# Patient Record
Sex: MALE | Race: BLACK OR AFRICAN AMERICAN | NOT HISPANIC OR LATINO | Employment: FULL TIME | URBAN - METROPOLITAN AREA
[De-identification: names, ages, dates, MRNs, and addresses within clinical notes are randomized per-mention and may not be internally consistent; named-entity substitution may affect disease eponyms.]

---

## 2018-11-07 ENCOUNTER — OFFICE VISIT (OUTPATIENT)
Dept: SLEEP MEDICINE | Facility: CLINIC | Age: 43
End: 2018-11-07
Payer: COMMERCIAL

## 2018-11-07 VITALS
BODY MASS INDEX: 36.66 KG/M2 | HEART RATE: 66 BPM | OXYGEN SATURATION: 95 % | SYSTOLIC BLOOD PRESSURE: 152 MMHG | WEIGHT: 276.6 LBS | DIASTOLIC BLOOD PRESSURE: 98 MMHG | HEIGHT: 73 IN

## 2018-11-07 DIAGNOSIS — E66.09 CLASS 2 OBESITY DUE TO EXCESS CALORIES WITH BODY MASS INDEX (BMI) OF 36.0 TO 36.9 IN ADULT: Primary | ICD-10-CM

## 2018-11-07 DIAGNOSIS — E66.812 CLASS 2 OBESITY DUE TO EXCESS CALORIES WITHOUT SERIOUS COMORBIDITY WITH BODY MASS INDEX (BMI) OF 36.0 TO 36.9 IN ADULT: ICD-10-CM

## 2018-11-07 DIAGNOSIS — G47.33 OSA (OBSTRUCTIVE SLEEP APNEA): Primary | ICD-10-CM

## 2018-11-07 DIAGNOSIS — E66.812 CLASS 2 OBESITY DUE TO EXCESS CALORIES WITH BODY MASS INDEX (BMI) OF 36.0 TO 36.9 IN ADULT: Primary | ICD-10-CM

## 2018-11-07 DIAGNOSIS — R03.0 ELEVATED BLOOD PRESSURE READING: ICD-10-CM

## 2018-11-07 DIAGNOSIS — E66.09 CLASS 2 OBESITY DUE TO EXCESS CALORIES WITHOUT SERIOUS COMORBIDITY WITH BODY MASS INDEX (BMI) OF 36.0 TO 36.9 IN ADULT: ICD-10-CM

## 2018-11-07 LAB
ANION GAP SERPL CALCULATED.3IONS-SCNC: 4 MMOL/L (ref 3–14)
BUN SERPL-MCNC: 17 MG/DL (ref 7–30)
CALCIUM SERPL-MCNC: 9.6 MG/DL (ref 8.5–10.1)
CHLORIDE SERPL-SCNC: 107 MMOL/L (ref 94–109)
CHOLEST SERPL-MCNC: 164 MG/DL
CO2 SERPL-SCNC: 30 MMOL/L (ref 20–32)
CREAT SERPL-MCNC: 1.08 MG/DL (ref 0.66–1.25)
GFR SERPL CREATININE-BSD FRML MDRD: 75 ML/MIN/1.7M2
GLUCOSE SERPL-MCNC: 110 MG/DL (ref 70–99)
HBA1C MFR BLD: 6.3 % (ref 0–5.6)
HDLC SERPL-MCNC: 31 MG/DL
LDLC SERPL CALC-MCNC: 76 MG/DL
NONHDLC SERPL-MCNC: 133 MG/DL
POTASSIUM SERPL-SCNC: 4.4 MMOL/L (ref 3.4–5.3)
SODIUM SERPL-SCNC: 141 MMOL/L (ref 133–144)
TRIGL SERPL-MCNC: 287 MG/DL
TSH SERPL DL<=0.005 MIU/L-ACNC: 1.1 MU/L (ref 0.4–4)

## 2018-11-07 PROCEDURE — 83036 HEMOGLOBIN GLYCOSYLATED A1C: CPT | Performed by: INTERNAL MEDICINE

## 2018-11-07 PROCEDURE — 36415 COLL VENOUS BLD VENIPUNCTURE: CPT | Performed by: INTERNAL MEDICINE

## 2018-11-07 PROCEDURE — 80048 BASIC METABOLIC PNL TOTAL CA: CPT | Performed by: INTERNAL MEDICINE

## 2018-11-07 PROCEDURE — 80061 LIPID PANEL: CPT | Performed by: INTERNAL MEDICINE

## 2018-11-07 PROCEDURE — 84443 ASSAY THYROID STIM HORMONE: CPT | Performed by: INTERNAL MEDICINE

## 2018-11-07 PROCEDURE — 99204 OFFICE O/P NEW MOD 45 MIN: CPT | Performed by: INTERNAL MEDICINE

## 2018-11-07 NOTE — NURSING NOTE
Chief Complaint   Patient presents with     Sleep Problem     I have been told that I stop breathing in my sleep. Also snores really loud.       Initial There were no vitals taken for this visit. There is no height or weight on file to calculate BMI.    Medication Reconciliation: complete    Neck circumference: 17.5 inches / 44.5 centimeters.

## 2018-11-07 NOTE — MR AVS SNAPSHOT
After Visit Summary   11/7/2018    Lopez Russ    MRN: [id number]           Patient Information     Date Of Birth          1975        Visit Information        Provider Department      11/7/2018 9:00 AM Jadiel Crowder MD [facility]        Today's Diagnoses     KYLAH (obstructive sleep apnea)    -  1    Elevated blood pressure reading        Class 2 obesity due to excess calories without serious comorbidity with body mass index (BMI) of 36.0 to 36.9 in adult          Care Instructions      Your BMI is Body mass index is 36.49 kg/(m^2).  Weight management is a personal decision.  If you are interested in exploring weight loss strategies, the following discussion covers the approaches that may be successful. Body mass index (BMI) is one way to tell whether you are at a healthy weight, overweight, or obese. It measures your weight in relation to your height.  A BMI of 18.5 to 24.9 is in the healthy range. A person with a BMI of 25 to 29.9 is considered overweight, and someone with a BMI of 30 or greater is considered obese. More than two-thirds of American adults are considered overweight or obese.  Being overweight or obese increases the risk for further weight gain. Excess weight may lead to heart disease and diabetes.  Creating and following plans for healthy eating and physical activity may help you improve your health.  Weight control is part of healthy lifestyle and includes exercise, emotional health, and healthy eating habits. Careful eating habits lifelong are the mainstay of weight control. Though there are significant health benefits from weight loss, long-term weight loss with diet alone may be very difficult to achieve- studies show long-term success with dietary management in less than 10% of people. Attaining a healthy weight may be especially difficult to achieve in those with severe obesity. In some cases, medications, devices and surgical management  might be considered.  What can you do?  If you are overweight or obese and are interested in methods for weight loss, you should discuss this with your provider.     Consider reducing daily calorie intake by 500 calories.     Keep a food journal.     Avoiding skipping meals, consider cutting portions instead.    Diet combined with exercise helps maintain muscle while optimizing fat loss. Strength training is particularly important for building and maintaining muscle mass. Exercise helps reduce stress, increase energy, and improves fitness. Increasing exercise without diet control, however, may not burn enough calories to loose weight.       Start walking three days a week 10-20 minutes at a time    Work towards walking thirty minutes five days a week     Eventually, increase the speed of your walking for 1-2 minutes at time    In addition, we recommend that you review healthy lifestyles and methods for weight loss available through the National Institutes of Health patient information sites:  http://win.niddk.nih.gov/publications/index.htm    And look into health and wellness programs that may be available through your health insurance provider, employer, local community center, or wade club.    Weight management plan: Patient was referred to their PCP to discuss a diet and exercise plan.              Follow-ups after your visit        Your next 10 appointments already scheduled     Nov 07, 2018 10:45 AM CST   LAB with BK LAB   [facility] ([facility])    [address] [place] [id number] [phone]           Please do not eat 10-12 hours before your appointment if you are coming in fasting for labs on lipids, cholesterol, or glucose (sugar). This does not apply to pregnant women. Water, hot tea and black coffee (with nothing added) are okay. Do not drink other fluids, diet soda or chew gum.            Nov 19, 2018 10:30 AM CST   HST  with BK BED  5   [facility] ([facility])    [address]  [facility] [address]   [phone]            Nov 20, 2018  8:00 AM CST   HST Drop Off with BK BED 5   [facility] ([facility])    [address]  [facility] [address]   [phone]            Nov 27, 2018  9:30 AM CST   Return Sleep Patient with Jadiel Crowder MD   [facility] ([facility])    [address]  [facility] [address]   [phone]              Future tests that were ordered for you today     Open Future Orders        Priority Expected Expires Ordered    HST-Home Sleep Apnea Test Routine  5/9/2019 11/7/2018    Basic metabolic panel Routine  11/7/2019 11/7/2018    Lipid panel reflex to direct LDL Non-fasting Routine 11/7/2018 11/21/2018 11/7/2018    TSH Routine  11/7/2019 11/7/2018            Who to contact     If you have questions or need follow up information about today's clinic visit or your schedule please contact [facility] directly at [phone].  Normal or non-critical lab and imaging results will be communicated to you by MyChart, letter or phone within 4 business days after the clinic has received the results. If you do not hear from us within 7 days, please contact the clinic through MyChart or phone. If you have a critical or abnormal lab result, we will notify you by phone as soon as possible.  Submit refill requests through Verient or call your pharmacy and they will forward the refill request to us. Please allow 3 business days for your refill to be completed.          Additional Information About Your Visit        Verient Information     Verient lets you send messages to your doctor, view your test results, renew your prescriptions, schedule appointments and more. To sign up, go to [email or web address] .  "Click on \"Log in\" on the left side of the screen, which will take you to the Welcome page. Then click on \"Sign up Now\" on the right side of the page.     You will be asked to enter the access code listed below, as well as some personal information. Please follow the directions to create your username and password.     Your access code is: [email or web address]  Expires: 2019  9:56 AM     Your access code will  in 90 days. If you need help or a new code, please call your [place] clinic or [phone].        Care EveryWhere ID     This is your Care EveryWhere ID. This could be used by other organizations to access your [place] medical records  [id number]        Your Vitals Were     Pulse Height Pulse Oximetry BMI (Body Mass Index)          66 1.854 m (6' 1\") 95% 36.49 kg/m2         Blood Pressure from Last 3 Encounters:   18 (!) 152/98   07/31/15 165/87   13 (!) 146/105    Weight from Last 3 Encounters:   18 125.5 kg (276 lb 9.6 oz)   13 124.7 kg (275 lb)              We Performed the Following     Hemoglobin A1c        Primary Care Provider Fax #    Physician No Ref-Primary [phone]       No address on file        Equal Access to Services     BOSSMAN SWAIN : Hadii cedrick tonyo Soerika, waaxda luqadaha, qaybta kaalmada adedeepak, juana marie . So [facility] [phone].    ATENCIÓN: Si habla español, tiene a chu disposición servicios gratuitos de asistencia lingüística. Llame al [phone].    We comply with applicable federal civil rights laws and [place] laws. We do not discriminate on the basis of race, color, national origin, age, disability, sex, sexual orientation, or gender identity.            Thank you!     Thank you for choosing [facility]  for your care. Our goal is always to provide you with excellent care. Hearing back from our patients is one way we can continue to improve our services. Please take a few minutes to complete " the written survey that you may receive in the mail after your visit with us. Thank you!             Your Updated Medication List - Protect others around you: Learn how to safely use, store and throw away your medicines at www.disposemymeds.org.      Notice  As of 11/7/2018 10:06 AM    You have not been prescribed any medications.

## 2018-11-07 NOTE — PROGRESS NOTES
Sleep Consultation:    Date on this visit: 11/7/2018    Primary Physician: No Ref-Primary, Physician     Lopez Russ is a 43 year old male presenting with complaints of snoring and witnessed apneas.    He has a history of obesity.      Schedule - Works as  for digital [employer].  Works 6 PM - 6 AM.  Typically working 4 - 5 shifts per week (4 one week and 5 the next).  Will work M Tu F Sa Su then W Th the next week, but often picks up another weekday (takes weekends off for watching kids).    If he works will sleep 10 AM - 4 PM; may have some trouble falling asleep.     If he's not working may sleep early so he can get up for the day, or may wait until night and go to bed in the early evening.    Sleep Disordered Breathing - Snoring is reported as loud and audible throughout the house.  Has had snort arousals and witnessed.   Has nocturia.  Occasional nocturnal GERD.   Upon waking feels groggy.  He denies morning headaches.  Does get morning dry mouth.  Does get morning sinus congestion.   Patient was counseled on the importance of driving while alert, to pull over if drowsy, or nap before getting into the vehicle if sleepy.    Movement/Behaviors - Does have somniloquy.  No somnambulism.  No sleep related eating.  Has had violent dream enactment behavior.   Patient denies typical restless legs syndrome symptoms.     He denies bruxism.     Alcohol use - Rare  Caffeine intake - 3 - 4 cups/day of coffee, 5 sodas/day. Last caffeine intake is usually throughout the day and night.  Tobacco exposure - Quit 4 days ago, was 1/2 ppd.  Illicit substances - MJ every day    Lives alone but has his 2 kids every other weekend.  Has no pets.     Does have family history of snoring in sister, brother, and mother.    Allergies:    No Known Allergies    Medications:    No current outpatient prescriptions on file.       Problem List:  Patient Active Problem List    Diagnosis Date Noted     Elevated blood  "pressure reading 11/07/2018     Priority: Medium        Past Medical/Surgical History:  Past Medical History:   Diagnosis Date     Gastro-oesophageal reflux disease      No past surgical history on file.    Social History:  Social History     Social History     Marital status: Single     Spouse name: N/A     Number of children: N/A     Years of education: N/A     Occupational History     Not on file.     Social History Main Topics     Smoking status: Former Smoker     Packs/day: 0.50     Quit date: 11/3/2018     Smokeless tobacco: Never Used     Alcohol use Yes      Comment: \"weekends\"     Drug use: Yes     Special: Marijuana     Sexual activity: Not on file     Other Topics Concern     Not on file     Social History Narrative       Family History:  No family history on file.    Review of Systems:  A complete review of systems reviewed by me is negative with the exeption of what has been mentioned in the history of present illness.  Increased thirst or urination.    Physical Examination:  Vitals: BP (!) 152/98  Pulse 66  Ht 1.854 m (6' 1\")  Wt 125.5 kg (276 lb 9.6 oz)  SpO2 95%  BMI 36.49 kg/m2  BMI= Body mass index is 36.49 kg/(m^2).    Neck Cir (cm): 45 cm    Davenport Total Score 11/7/2018   Total score - Davenport 13     REECE Total Score: 24 (11/07/18 0900)    General appearance: No apparent distress, well groomed.    HEENT:   Head: Normocephalic, atraumatic.  Eyes: PERRL  Nose: Nares patent.  No exudate.  No septal deviation noted.  Mouth: Teeth: Good   Tongue: Scalloped  Oropharynx:  Mallampati Classification: IV    Tonsils: Not visible    Uvula: Not visible    Neck: Supple without bruit.     Neck Cir (cm): 45 cm (11/7/2018  9:00 AM)    Cardiac: Regular rate and rhythm.  No murmurs.  Radial pulses are strong and symmetric.  Pulmonary: Symmetric air movement, lungs clear to auscultation bilaterally.  Musculoskeletal: No edema noted.  No clubbing or cyanosis.  Skin: Warm, dry, intact.  Neurologic: Alert and " oriented to person, place and time   Gait is normal.  Psychiatric: Affect pleasant.  Mood good.    Impression/Plan:  1. KYLAH (obstructive sleep apnea)  I have a high suspicion for KYLAH based on presence of snoring, snort arousals, witnessed apneas, enlarged neck girth >= 43 cm for male, and obesity with excessive daytime sleepiness. We discussed pathophysiology of obstructive sleep apnea, testing with overnight polysomnography, and treatment modalities (CPAP only discussed at this visit). We discussed consequences of untreated KYLAH. Patient is interested in treatment and willing to undergo overnight sleep testing.    Home sleep testing is appropriate for this patient  Study has been ordered today.    - HST-Home Sleep Apnea Test; Future    2. Elevated blood pressure reading  Patient was counseled on the effects of untreated/sub-optimally treated hypertension.    Patient to follow up with Primary Care provider regarding elevated blood pressure.   Encouraged to make PCP appt today with new provider.    3. Class 2 obesity due to excess calories without serious comorbidity with body mass index (BMI) of 36.0 to 36.9 in adult  We discussed the link between obesity, sleep apnea, and health outcomes.  Patient was encouraged to decrease caloric intake and increase activity levels to try to move towards a normal weight.  He was encouraged to discuss further strategies with his primary care provider.   Will screen for hypothyroidism, DM2 (given prior borderline elevated BG and increased thirst/urination), kidney disease and hyperlipidemia as part of metabolic syndrome.  - Hemoglobin A1c  - Basic metabolic panel; Future  - Lipid panel reflex to direct LDL Non-fasting; Future  - TSH; Future    Literature provided regarding sleep apnea.      He will follow up with me in approximately two weeks after his sleep study has been competed to review the results and discuss plan of care.       Sleep Study reviewed.  Obstructive sleep apnea  reviewed.  Complications of untreated sleep apnea were reviewed.    Jadiel Crowder MD, Sleep Physician  Nov 7, 2018

## 2018-11-07 NOTE — PATIENT INSTRUCTIONS

## 2018-11-20 ENCOUNTER — OFFICE VISIT (OUTPATIENT)
Dept: SLEEP MEDICINE | Facility: CLINIC | Age: 43
End: 2018-11-20
Payer: COMMERCIAL

## 2018-11-20 DIAGNOSIS — G47.33 OSA (OBSTRUCTIVE SLEEP APNEA): Primary | ICD-10-CM

## 2018-11-20 NOTE — MR AVS SNAPSHOT
After Visit Summary   11/20/2018    Lopez Russ    MRN: [id number]           Patient Information     Date Of Birth          1975        Visit Information        Provider Department      11/20/2018 11:45 AM BK BED 5 [facility]        Today's Diagnoses     KYLAH (obstructive sleep apnea)    -  1       Follow-ups after your visit        Your next 10 appointments already scheduled     Nov 23, 2018  7:00 AM CST   [facility]ios [facility]    [address] [place] [id number] [phone]           Bring a current list of meds and any records pertaining to this visit. For Physicals, please bring immunization records and any forms needing to be filled out. Please arrive 10 minutes early to complete paperwork.            Nov 27, 2018  9:30 AM CST   Return Sleep Patient with Jadiel Crowder MD   [facility] ([facility])    [address] [place] [id number] [phone]              Who to contact     If you have questions or need follow up information about today's clinic visit or your schedule please contact [facility] directly at [phone].  Normal or non-critical lab and imaging results will be communicated to you by Zindigohart, letter or phone within 4 business days after the clinic has received the results. If you do not hear from us within 7 days, please contact the clinic through Zindigohart or phone. If you have a critical or abnormal lab result, we will notify you by phone as soon as possible.  Submit refill requests through [email or web address] or call your pharmacy and they will forward the refill request to us. Please allow 3 business days for your refill to be completed.          Additional Information About Your Visit        myfab5 Information     [email or web address] lets you send messages to your doctor, view  "your test results, renew your prescriptions, schedule appointments and more. To sign up, go to [email or web address] . Click on \"Log in\" on the left side of the screen, which will take you to the Welcome page. Then click on \"Sign up Now\" on the right side of the page.     You will be asked to enter the access code listed below, as well as some personal information. Please follow the directions to create your username and password.     Your access code is: [email or web address]  Expires: 2019  9:56 AM     Your access code will  in 90 days. If you need help or a new code, please call your [place] clinic or [phone].        Care EveryWhere ID     This is your Care EveryWhere ID. This could be used by other organizations to access your [place] medical records  [id number]         Blood Pressure from Last 3 Encounters:   18 (!) 152/98   07/31/15 165/87   13 (!) 146/105    Weight from Last 3 Encounters:   18 125.5 kg (276 lb 9.6 oz)   13 124.7 kg (275 lb)              Today, you had the following     No orders found for display       Primary Care Provider Fax #    Physician No Ref-Primary [phone]       No address on file        Equal Access to Services     BOSSMAN SWAIN : Hadii cedrick tonyo Soerika, waaxda luqadaha, qaybta kaalmada adeegyacj, juana marie . So [facility] [phone].    ATENCIÓN: Si habla español, tiene a chu disposición servicios gratuitos de asistencia lingüística. Llame al [phone].    We comply with applicable federal civil rights laws and [place] laws. We do not discriminate on the basis of race, color, national origin, age, disability, sex, sexual orientation, or gender identity.            Thank you!     Thank you for choosing [facility]  for your care. Our goal is always to provide you with excellent care. Hearing back from our patients is one way we can continue to improve our services. Please take a few minutes " to complete the written survey that you may receive in the mail after your visit with us. Thank you!             Your Updated Medication List - Protect others around you: Learn how to safely use, store and throw away your medicines at www.disposemymeds.org.      Notice  As of 11/20/2018 11:58 AM    You have not been prescribed any medications.

## 2018-12-05 ENCOUNTER — OFFICE VISIT (OUTPATIENT)
Dept: SLEEP MEDICINE | Facility: CLINIC | Age: 43
End: 2018-12-05
Payer: COMMERCIAL

## 2018-12-05 DIAGNOSIS — G47.33 OSA (OBSTRUCTIVE SLEEP APNEA): ICD-10-CM

## 2018-12-05 PROCEDURE — G0399 HOME SLEEP TEST/TYPE 3 PORTA: HCPCS | Performed by: INTERNAL MEDICINE

## 2018-12-05 NOTE — PROGRESS NOTES
Pt is completing a home sleep test. Pt was instructed on how to put on the Noxturnal T3 device and associated equipment before going to bed and given the opportunity to practice putting it on before leaving the sleep center. Pt was reminded to bring the home sleep test kit back to the center tomorrow, at agreed upon time for download and reporting.     Suzan Madsen MA on 12/5/2018 at 9:52 AM

## 2018-12-05 NOTE — MR AVS SNAPSHOT
"              After Visit Summary   12/5/2018    Lopez Russ    MRN: [id number]           Patient Information     Date Of Birth          1975        Visit Information        Provider Department      12/5/2018 8:30 AM BK BED 5 [facility]        Today's Diagnoses     KYLAH (obstructive sleep apnea)           Follow-ups after your visit        Your next 10 appointments already scheduled     Dec 06, 2018 11:30 AM CST   HST Drop Off with BK BED 5   [facility] ([facility])    [address] [facility] [facility] [address]  [facility] [id number] [phone]            Dec 19, 2018  1:00 PM CST   Return Sleep Patient with Jadiel Crowder MD   [facility] ([facility])    [address] [place] [address] [facility] [address]  [facility] [id number] [phone]              Who to contact     If you have questions or need follow up information about today's clinic visit or your schedule please contact [facility] directly at [phone].  Normal or non-critical lab and imaging results will be communicated to you by Cinetraffichart, letter or phone within 4 business days after the clinic has received the results. If you do not hear from us within 7 days, please contact the clinic through Think Good Thoughtst or phone. If you have a critical or abnormal lab result, we will notify you by phone as soon as possible.  Submit refill requests through [employer] or call your pharmacy and they will forward the refill request to us. Please allow 3 business days for your refill to be completed.          Additional Information About Your Visit        Cinetraffichart Information     [employer] lets you send messages to your doctor, view your test results, renew your prescriptions, schedule appointments and more. To sign up, go to [email or web address] . Click on \"Log in\" on the left side of the screen, which will take you to the Welcome page. Then click " "on \"Sign up Now\" on the right side of the page.     You will be asked to enter the access code listed below, as well as some personal information. Please follow the directions to create your username and password.     Your access code is: [email or web address]  Expires: 2019  9:56 AM     Your access code will  in 90 days. If you need help or a new code, please call your [place] clinic or [phone].        Care EveryWhere ID     This is your Care EveryWhere ID. This could be used by other organizations to access your [place] medical records  [id number]         Blood Pressure from Last 3 Encounters:   18 (!) 152/98   07/31/15 165/87   13 (!) 146/105    Weight from Last 3 Encounters:   18 125.5 kg (276 lb 9.6 oz)   13 124.7 kg (275 lb)              We Performed the Following     HST-Home Sleep Apnea Test        Primary Care Provider Fax #    Physician No Ref-Primary [phone]       No address on file        Equal Access to Services     [facility] : Hadii cedrick tonyo Soerika, waaxda luqadaha, qaybta kaalmacj [place], juana marie . So [facility] [phone].    ATENCIÓN: Si habla español, tiene a chu disposición servicios gratuitos de asistencia lingüística. Jag al [phone].    We comply with applicable federal civil rights laws and [place] laws. We do not discriminate on the basis of race, color, national origin, age, disability, sex, sexual orientation, or gender identity.            Thank you!     Thank you for choosing [facility]  for your care. Our goal is always to provide you with excellent care. Hearing back from our patients is one way we can continue to improve our services. Please take a few minutes to complete the written survey that you may receive in the mail after your visit with us. Thank you!             Your Updated Medication List - Protect others around you: Learn how to safely use, store and throw away your " medicines at www.disposemymeds.org.      Notice  As of 12/5/2018  9:54 AM    You have not been prescribed any medications.

## 2018-12-06 ENCOUNTER — APPOINTMENT (OUTPATIENT)
Dept: SLEEP MEDICINE | Facility: CLINIC | Age: 43
End: 2018-12-06
Payer: COMMERCIAL

## 2018-12-06 NOTE — PROGRESS NOTES
Pt returned HST device. It was downloaded and forwarded data to the clinical specialist for scoring.    Suzan Madsen MA on 12/6/2018 at 10:57 AM

## 2018-12-11 NOTE — PROGRESS NOTES
Response from Belgica Hernandez :  Jag Singh,    He is OK to complete an inlab study  Thank you,  Winnie    From: Suzan Madsen   Sent: Tuesday, December 11, 2018 12:55 PM  To: Winnie Hernandez  Subject: PHI    Jag Zhou-  Dr. Crowder was wondering if we could get this pt approved for an in-lab psg as he has failed 2 hst s?    Lopez Russ  Male, 43 yrs, 1975  MRN:   [id number]    Suzan Madsen MA on 12/11/2018 at 4:13 PM

## 2019-01-07 ENCOUNTER — THERAPY VISIT (OUTPATIENT)
Dept: SLEEP MEDICINE | Facility: CLINIC | Age: 44
End: 2019-01-07
Payer: COMMERCIAL

## 2019-01-07 DIAGNOSIS — G47.33 OSA (OBSTRUCTIVE SLEEP APNEA): ICD-10-CM

## 2019-01-07 PROCEDURE — 95811 POLYSOM 6/>YRS CPAP 4/> PARM: CPT | Performed by: INTERNAL MEDICINE

## 2019-01-08 PROBLEM — G47.33 OSA (OBSTRUCTIVE SLEEP APNEA): Status: ACTIVE | Noted: 2019-01-08

## 2019-01-08 NOTE — PATIENT INSTRUCTIONS
Completed a split night PSG per provider order.    Preliminary AHI 80.  A final therapeutic PAP pressure was not achieved.    Supine REM was not seen on therapeutic pressure.    Patient reports feeling refreshed in AM.

## 2019-01-08 NOTE — PROCEDURES
" SLEEP STUDY INTERPRETATION  SPLIT NIGHT STUDY      Patient: MATT KERNS  YOB: 1975  Study Date: 1/7/2019  MRN: [id number]  Referring Provider: none  Ordering Provider: Jadiel Crowder MD    Indications for Polysomnography: The patient is a 43 y old Male who is 6' 1\" and weighs 276.0 lbs. His BMI is 36.6, Finley sleepiness scale 13.0 and neck circumference is 45.0 cm. Relevant medical history includes obesity. A diagnostic polysomnogram was performed to evaluate for KYLAH. After 138.5 minutes of sleep time the patient exhibited sufficient respiratory events qualifying him for a CPAP trial which was then initiated.    Polysomnogram Data: A full night polysomnogram recorded the standard physiologic parameters including EEG, EOG, EMG, ECG, nasal and oral airflow. Respiratory parameters of chest and abdominal movements were recorded with respiratory inductance plethysmography. Oxygen saturation was recorded by pulse oximetry.  Hypopnea scoring rule used: 1B 4%    Diagnostic PSG  Sleep Architecture: Normal sleep latency without sleep aid, increased arousal index, and decreased sleep efficiency.  The total recording time of the polysomnogram was 174.0 minutes. The total sleep time was 138.5 minutes. Sleep latency was normal at 5.5 minutes without the use of a sleep aid. REM latency was 99.0 minutes. Arousal index was increased at 90.5 arousals per hour. Sleep efficiency was decreased at 79.6%. Wake after sleep onset was 11.5 minutes. The patient spent 62.1% of total sleep time in Stage N1, 31.0% in Stage N2, 0.0% in Stage N3, and 6.9% in REM. Time in REM supine was 9.5 minutes.    Respiration: Very severe obstructive sleep apnea with associated hypoxia.    Events ? The polysomnogram revealed a presence of 53 obstructive, - central, and - mixed apneas resulting in an apnea index of 23.0 events per hour. There were 132 obstructive hypopneas and - central hypopneas resulting in an obstructive hypopnea index " of 57.2 and central hypopnea index of - events per hour. The combined apnea/hypopnea index was 80.1 events per hour (central apnea/hypopnea index was - events per hour).  The REM AHI was 56.8 events per hour. The supine AHI was 88.1 events per hour. The RERA index was 5.2 events per hour. The RDI was 85.3 events per hour.    Snoring - was reported as moderate to extremely loud.    Respiratory rate and pattern - was normal.    Sustained Sleep Associated Hypoventilation - Transcutaneous carbon dioxide monitoring was not used; however significant hypoventilation was not suggested by oximetry.    Sleep Associated Hypoxemia - (Greater than 5 minutes O2 sat at or below 88%) was present. Baseline oxygen saturation was 85.7%. Lowest oxygen saturation was 36.8%. Time spent less than or equal to 88% was 78.5 minutes. Time spent less than or equal to 89% was 84.6 minutes.     Treatment PSG  Sleep Architecture: On PAP, increased arousal index and decreased sleep efficiency.  At 01:00:42 AM the patient was placed on PAP treatment and was titrated at pressures ranging from CPAP 5 cmH2O up to BiLevel 20/15/0 cmH2O. The total recording time of the treatment portion of the study was 300.8 minutes. The total sleep time was 246.5 minutes. During the treatment portion of the study the sleep latency was 5.0 minutes. REM latency was 3.5 minutes. Arousal index was increased at 23.1 arousals per hour. Sleep efficiency was decreased at 82.0%. Wake after sleep onset was 48.5 minutes. The patient spent 15.6% of total sleep time in Stage N1, 15.4% in Stage N2, 13.2% in Stage N3, and 55.8% in REM. Time in REM supine was - minutes.     Respiration: Adequate titration.  Completely untreated while supine.  Was unable to tolerate nasal mask with CPAP 5 cmH2O and used FFM.     The final pressure was BiLevel 20/15/0 cmH2O with an AHI of - events per hour. Time in REM supine on final pressure was - minutes.     This titration was  considered:  - Adequate (residual AHI with 75% decrease or above constraints without REM?supine sleep at final pressure).    Movement Activity: Few PLMs without evidence of clinical significance.    Periodic Limb Movements  o During the diagnostic portion of the study, there were - PLMs recorded.  o During the treatment portion of the study, there were 4 PLMs recorded. The PLM index was 1.0 movements per hour. The PLM Arousal Index was 0.5 per hour.    REM EMG Activity - Excessive muscle activity was not present.    Nocturnal Behavior - Abnormal sleep related behaviors were not noted.    Bruxism - None apparent.    Cardiac Summary: Frequent PACs.  Heart rate and rhythm stabilized on PAP therapy.  During the diagnostic portion of the study, the average pulse rate was 62.5 bpm. The minimum pulse rate was 25.0 bpm while the maximum pulse rate was 126.2 bpm.    During the treatment portion of the study, the average pulse rate was 62.3 bpm. The minimum pulse rate was 41.8 bpm while the maximum pulse rate was 99.4 bpm.     Assessment:     Normal sleep latency without sleep aid, increased arousal index, and decreased sleep efficiency.    On PAP, increased arousal index and decreased sleep efficiency.    Very severe obstructive sleep apnea with associated hypoxia.    Adequate Bilevel titration.  CPAP was ineffective.     Completely untreated while supine.      Was unable to tolerate nasal mask with CPAP 5 cmH2O and used FFM.     Few PLMs without evidence of clinical significance.    Frequent PACs.  Heart rate and rhythm stabilized on PAP therapy.    Recommendations:    Recommend treatment of KYLAH with Auto?titrating Bilevel PAP therapy with a range of 14 - 25 cmH2O with PS of 5 cmH2O. Recommend clinical follow up with sleep management team, including review of compliance measures.    If devices are not acceptable or effective, patient may benefit from evaluation of possible surgical options for the treatment of obstructive  sleep apnea and/or socially disruptive snoring. If he is interested, would recommend referral to specialized ENT?Sleep provider.    Advice regarding the risks of drowsy driving.    Suggest optimizing sleep schedule and avoiding sleep deprivation.    Weight management (if BMI > 30).    Pharmacologic therapy should be used for management of restless legs syndrome only if present and clinically indicated and not based on the presence of periodic limb movements alone.    Diagnostic Codes:   Obstructive Sleep Apnea G47.33  Sleep Hypoxemia/Hypoventilation G47.36    _____________________________________   Electronically Signed By: Jadiel Crowder MD 1/8/2019

## 2019-01-08 NOTE — PROGRESS NOTES
Severe Obstructive Sleep Apnea but only adequate titration with FFM.  Called patient to review results per urgent protocol.  CPAP ineffective.  Needs Bilevel and likely auto.   Didn't mind treatment.  Order placed through [facility].  Jadiel Crowder MD, Sleep Physician  4:13 PM, 1/8/2019

## 2019-01-09 ENCOUNTER — TELEPHONE (OUTPATIENT)
Dept: SLEEP MEDICINE | Facility: CLINIC | Age: 44
End: 2019-01-09

## 2019-01-09 DIAGNOSIS — G47.33 OSA (OBSTRUCTIVE SLEEP APNEA): ICD-10-CM

## 2019-01-09 LAB — SLPCOMP: NORMAL

## 2019-01-09 NOTE — TELEPHONE ENCOUNTER
Patient called back in to [facility] to schedule pap setup. Patient ok to come into [facility] for STAT setup. Patient unable to come tomorrow. Scheduled for 1 pm on Friday. Patient would like me to email him appointment information to [email or web address]

## 2019-01-11 ENCOUNTER — DOCUMENTATION ONLY (OUTPATIENT)
Dept: SLEEP MEDICINE | Facility: CLINIC | Age: 44
End: 2019-01-11

## 2019-01-11 DIAGNOSIS — G47.33 OSA (OBSTRUCTIVE SLEEP APNEA): ICD-10-CM

## 2019-01-11 NOTE — PROGRESS NOTES
Patient was offered choice of vendor and chose [facility].  Patient Lopez Russ was set up at [place] on January 11, 2019. Patient received a Resmed AirCurve 10 Bilevel. Pressures were set at EPAP 14, IPAP 25, PS 5.   Patient s ramp is 5 cm H2O for 10 min.  Patient received a Resmed Mask name: Airtouch F20  Full Face mask size Medium, heated tubing and heated humidifier.  Patient is enrolled in the STM Program and does not need to meet compliance. Patient has a follow up on 1/29/19 with Dr. Crowder.    Inrgid Nieves

## 2019-01-14 ENCOUNTER — DOCUMENTATION ONLY (OUTPATIENT)
Dept: SLEEP MEDICINE | Facility: CLINIC | Age: 44
End: 2019-01-14
Payer: COMMERCIAL

## 2019-01-14 NOTE — PROGRESS NOTES
3 DAY STM VISIT    Diagnostic AHI: 80.1 PSG    Patient contacted for 3 day STM visit.  Subjective measures:  Patient stated things are going good with is machine. He's thinks he has fixed his mask leak problem. Patient pulls the mask off with out knowing.       Device type: Bilevel  PAP settings from order::  EPAP 14, IPAP 25, PS 5.  Mask type:    Full Face Mask     Device settings from machine      Assessment: Nightly usage, most nights over four hours. Reviewed data with patient and told him it's common to pull your mask off without knowing in the first week or two.   Action plan: Pt to have f/u 14 day STM visit.  Patient has a follow up visit scheduled:   yes within 31-90 days of set up.

## 2019-01-17 ENCOUNTER — TELEPHONE (OUTPATIENT)
Dept: SLEEP MEDICINE | Facility: CLINIC | Age: 44
End: 2019-01-17

## 2019-01-17 DIAGNOSIS — G47.33 OSA (OBSTRUCTIVE SLEEP APNEA): ICD-10-CM

## 2019-01-17 NOTE — TELEPHONE ENCOUNTER
Called patient to follow up with bipap. Patient states things are getting better, he's able to wear his bipap for a longer period of time. Patient states he's feeling more energetic since using bipap. I informed patient that his download looks good so far. Some nights AHI is corrected below 5, and other nights has a residual AHI; highest I've seen so far is 12. I informed patient that's pretty good coming from a baseline AHI of 80.1. Patient was glad to hear that. Instructed patient to call me if he has any questions or concerns, otherwise, I will follow up again soon. Patient agreed to do so and expressed understanding.

## 2019-01-28 ENCOUNTER — DOCUMENTATION ONLY (OUTPATIENT)
Dept: SLEEP MEDICINE | Facility: CLINIC | Age: 44
End: 2019-01-28

## 2019-01-28 DIAGNOSIS — G47.33 OSA (OBSTRUCTIVE SLEEP APNEA): ICD-10-CM

## 2019-01-28 NOTE — PROGRESS NOTES
14  DAY STM VISIT    Diagnostic AHI: 80.1   PSG    Subjective measures:  Pt reporting that his usage is increasing that he is starting to feel  benefit from therapy.  He is not having any issues with pressures .  He is now not feeling any mask leak.      Assessment: Pt not meeting objective benchmarks for compliance, leak and AHI  Patient meeting subjective benchmarks.     Action plan: pt to have 30 day STM visit.      Device type: Bilevel    PAP settings: 25/14 PS 5    Mask type:  Full Face Mask    Objective measures: 14 day rolling measures      Compliance  35 %      Leak  48.77 lpm  last  upload      AHI 6.59   last  upload      Average number of minutes 210      Objective measure goal  Compliance   Goal >70%  Leak   Goal < 24 lpm  AHI  Goal < 5  Usage  Goal >240

## 2019-01-29 ENCOUNTER — OFFICE VISIT (OUTPATIENT)
Dept: SLEEP MEDICINE | Facility: CLINIC | Age: 44
End: 2019-01-29
Payer: COMMERCIAL

## 2019-01-29 ENCOUNTER — TELEPHONE (OUTPATIENT)
Dept: SLEEP MEDICINE | Facility: CLINIC | Age: 44
End: 2019-01-29

## 2019-01-29 VITALS
SYSTOLIC BLOOD PRESSURE: 154 MMHG | HEART RATE: 54 BPM | OXYGEN SATURATION: 95 % | WEIGHT: 284 LBS | BODY MASS INDEX: 36.45 KG/M2 | HEIGHT: 74 IN | DIASTOLIC BLOOD PRESSURE: 95 MMHG

## 2019-01-29 DIAGNOSIS — G47.33 OSA (OBSTRUCTIVE SLEEP APNEA): ICD-10-CM

## 2019-01-29 ASSESSMENT — MIFFLIN-ST. JEOR: SCORE: 2252.97

## 2019-01-29 NOTE — PATIENT INSTRUCTIONS
Your BMI is Body mass index is 36.46 kg/m .  Weight management is a personal decision.  If you are interested in exploring weight loss strategies, the following discussion covers the approaches that may be successful. Body mass index (BMI) is one way to tell whether you are at a healthy weight, overweight, or obese. It measures your weight in relation to your height.  A BMI of 18.5 to 24.9 is in the healthy range. A person with a BMI of 25 to 29.9 is considered overweight, and someone with a BMI of 30 or greater is considered obese. More than two-thirds of American adults are considered overweight or obese.  Being overweight or obese increases the risk for further weight gain. Excess weight may lead to heart disease and diabetes.  Creating and following plans for healthy eating and physical activity may help you improve your health.  Weight control is part of healthy lifestyle and includes exercise, emotional health, and healthy eating habits. Careful eating habits lifelong are the mainstay of weight control. Though there are significant health benefits from weight loss, long-term weight loss with diet alone may be very difficult to achieve- studies show long-term success with dietary management in less than 10% of people. Attaining a healthy weight may be especially difficult to achieve in those with severe obesity. In some cases, medications, devices and surgical management might be considered.  What can you do?  If you are overweight or obese and are interested in methods for weight loss, you should discuss this with your provider.     Consider reducing daily calorie intake by 500 calories.     Keep a food journal.     Avoiding skipping meals, consider cutting portions instead.    Diet combined with exercise helps maintain muscle while optimizing fat loss. Strength training is particularly important for building and maintaining muscle mass. Exercise helps reduce stress, increase energy, and improves fitness.  Increasing exercise without diet control, however, may not burn enough calories to loose weight.       Start walking three days a week 10-20 minutes at a time    Work towards walking thirty minutes five days a week     Eventually, increase the speed of your walking for 1-2 minutes at time    In addition, we recommend that you review healthy lifestyles and methods for weight loss available through the National Institutes of Health patient information sites:  http://win.niddk.nih.gov/publications/index.htm    And look into health and wellness programs that may be available through your health insurance provider, employer, local community center, or wade club.    Weight management plan: Patient was referred to their PCP to discuss a diet and exercise plan.

## 2019-01-29 NOTE — TELEPHONE ENCOUNTER
Reviewed download and patient's AHI was elevated last night, but most nights AHI is corrected or has a residual AHI of around 6 events per hour based on 7 day detailed report. Called patient to follow up with bipap therapy. Patient states things are going well; feels energized. Patient has no questions or concerns at this time. Patient states he saw Dr. Crowder today and was told that the appointment should've been cancelled. I apologized that, that happened. Patient states he hasa follow up scheduled for 4/2/19 at 10 AM.

## 2019-01-29 NOTE — NURSING NOTE
"Chief Complaint   Patient presents with     Study Results       Initial BP (!) 154/95   Pulse 54   Ht 1.88 m (6' 2\")   Wt 128.8 kg (284 lb)   SpO2 95%   BMI 36.46 kg/m   Estimated body mass index is 36.46 kg/m  as calculated from the following:    Height as of this encounter: 1.88 m (6' 2\").    Weight as of this encounter: 128.8 kg (284 lb).    Medication Reconciliation: complete      "

## 2019-01-29 NOTE — PROGRESS NOTES
Sleep Study Follow-Up Visit:    Date on this visit: 1/29/2019  Visit was supposed to be canceled as we already did a review on the phone and he's started BiPAP.  Since starting Bilevel PAP he finds he is sleeping better and not as tired during the day.  Use is slowly improving since getting PAP.  Only nights he hasn't used PAP have been because he's staying with someone else.  Short nights he's been taking CPAP off.        Impression/Plan:  1. KYLAH (obstructive sleep apnea)  Compliance visit in 1.5 months (before April 10th).    - minutes spent with patient, all of which were spent face-to-face counseling, consulting, coordinating plan of care.      CC: No Ref-Primary, Physician

## 2019-02-12 ENCOUNTER — DOCUMENTATION ONLY (OUTPATIENT)
Dept: SLEEP MEDICINE | Facility: CLINIC | Age: 44
End: 2019-02-12

## 2019-02-12 NOTE — PROGRESS NOTES
30 DAY RUST VISIT    Diagnostic AHI: 80.1 PSG    Unable to leave to message.    Assessment: Pt not meeting objective benchmarks for compliance. Patient has pending MyChart.  Action plan:   Patient has scheduled a follow up visit with Dr. Crowder on 4/2/2019.   Device type: Bilevel  PAP settings: 25/14 cm    95th% pressure 22.9 cm  H20   Mask type:  Full Face Mask  Objective measures: 14 day rolling measures      Compliance  64 %      Leak  28.72 lpm  last  upload      AHI 15.44   last  upload      Average number of minutes 321      Objective measure goal  Compliance   Goal >70%  Leak   Goal < 24 lpm  AHI  Goal < 5  Usage  Goal >240

## 2019-02-26 ENCOUNTER — TELEPHONE (OUTPATIENT)
Dept: SLEEP MEDICINE | Facility: CLINIC | Age: 44
End: 2019-02-26

## 2019-02-26 DIAGNOSIS — G47.33 OSA (OBSTRUCTIVE SLEEP APNEA): ICD-10-CM

## 2019-02-26 NOTE — TELEPHONE ENCOUNTER
Called patient to see how he's doing with bipap. Patient states things are going well. Patient would like to come in for a new mask. Informed him that he's not eligible for another 2 months, but he's eligible for a cushion. Patient would like the silicone cushion and not memory foam. Confirmed address and will ship out cushion. Instructed patient to call me if he has any questions or concerns.

## 2019-07-09 ENCOUNTER — DOCUMENTATION ONLY (OUTPATIENT)
Dept: SLEEP MEDICINE | Facility: CLINIC | Age: 44
End: 2019-07-09

## 2019-07-09 DIAGNOSIS — G47.33 OSA (OBSTRUCTIVE SLEEP APNEA): ICD-10-CM

## 2019-07-09 NOTE — PROGRESS NOTES
6 month [facility] Recheck Visit     Diagnostic AHI: 80.1  PSG    Subjective measures:   Pt states that things are going ok. He does need to order new supplies.  He would like to go back to his old mask.    Assessment: Pt not meeting objective benchmarks for leak and compliance  Patient failing following subjective benchmarks: leak issues  and mask discomfort   Action plan: pt to follow up per provider request       Device type: Bilevel  PAP settings: EPAP min 14 cm  H20     IPAP max 25 cm  H20    PS fixed 5 cm  H20  Objective measures: 14 day rolling measures      Compliance  50 %      Leak  76.63 lpm  last  upload      AHI 3.42   last  upload      Average number of minutes 255      Objective measure goal  Compliance   Goal >70%  Leak   Goal < 24 lpm  AHI  Goal < 5  Usage  Goal >240

## 2020-04-13 DIAGNOSIS — G47.33 OSA (OBSTRUCTIVE SLEEP APNEA): Primary | ICD-10-CM

## 2020-04-15 ENCOUNTER — TELEPHONE (OUTPATIENT)
Dept: SLEEP MEDICINE | Facility: CLINIC | Age: 45
End: 2020-04-15

## 2020-04-15 DIAGNOSIS — G47.33 OSA (OBSTRUCTIVE SLEEP APNEA): ICD-10-CM

## 2022-02-18 ENCOUNTER — TELEPHONE (OUTPATIENT)
Dept: SLEEP MEDICINE | Facility: CLINIC | Age: 47
End: 2022-02-18
Payer: COMMERCIAL

## 2022-02-18 DIAGNOSIS — G47.33 OSA (OBSTRUCTIVE SLEEP APNEA): ICD-10-CM

## 2022-02-28 ENCOUNTER — DOCUMENTATION ONLY (OUTPATIENT)
Dept: SLEEP MEDICINE | Facility: CLINIC | Age: 47
End: 2022-02-28
Payer: COMMERCIAL

## 2022-02-28 DIAGNOSIS — G47.33 OSA (OBSTRUCTIVE SLEEP APNEA): ICD-10-CM

## 2022-02-28 NOTE — PROGRESS NOTES
PT CAME TO [facility] AND I CHECKED PRESSURE ON BI-LEVEL TO MAKE SURE SET CORRECTLY PER PT REQUEST.  30 DAY DOWNLOAD ASLO CONFIMED PRESSURES SET CORRECTLY.   PT FILTER WAS BLACK AND MACHINE DIRTY,I WIPED DOWN CPAP AND ISSUED ALL NEW CPP SUPPLIES AND WENT OVER SUPPLY REPLACEMENT  SCHEDULE AND HOW TO CARE FOR CPAP.

## 2022-04-25 ENCOUNTER — DOCUMENTATION ONLY (OUTPATIENT)
Dept: SLEEP MEDICINE | Facility: CLINIC | Age: 47
End: 2022-04-25
Payer: COMMERCIAL

## 2022-04-25 DIAGNOSIS — G47.33 OSA (OBSTRUCTIVE SLEEP APNEA): Primary | ICD-10-CM

## 2022-08-11 ENCOUNTER — TELEPHONE (OUTPATIENT)
Dept: SLEEP MEDICINE | Facility: CLINIC | Age: 47
End: 2022-08-11

## 2022-08-11 DIAGNOSIS — G47.33 OSA (OBSTRUCTIVE SLEEP APNEA): Primary | ICD-10-CM
